# Patient Record
Sex: MALE | Race: WHITE | URBAN - METROPOLITAN AREA
[De-identification: names, ages, dates, MRNs, and addresses within clinical notes are randomized per-mention and may not be internally consistent; named-entity substitution may affect disease eponyms.]

---

## 2017-02-01 ENCOUNTER — TELEPHONE (OUTPATIENT)
Dept: FAMILY MEDICINE | Facility: CLINIC | Age: 62
End: 2017-02-01

## 2017-02-01 NOTE — TELEPHONE ENCOUNTER
"Pt reports having some tearing or discomfort with having BM lately, and this am had a large clot come out. Last Thursday evening, pt reports large BM about 3\" around and 18\" long and blood on toilet paper.  Pt reports having external hemorrhoids.        Duration: this am, blood from rectum with large clot. Reports having BM every 2-3 days.      Description (location/character/radiation): rectal area    Intensity:  moderate    Accompanying signs and symptoms: see above, denies pain, denies further bleeding    History (similar episodes/previous evaluation): yes    Precipitating or alleviating factors: None    Therapies tried and outcome: previous suppository for hemorrhoids, not used for this lately. Pt eats fiber and fruit.       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  Putting off appointments for a long time. Pt denies nausea, eating fine, working. [phone] cell or home is better (can leave a detailed vm with wife, Betty per pt request).      Pt advised to call the clinic with any further symptoms or changes. Also advised to go to UC or ER if symptoms increase. The patient indicates understanding of these issues and agrees with the plan. Advised pt to schedule and let wife know, pts cell was busy upon call back. Wife scheduled pt.     Ninfa Frazier RN          "

## 2017-02-02 ENCOUNTER — OFFICE VISIT (OUTPATIENT)
Dept: FAMILY MEDICINE | Facility: CLINIC | Age: 62
End: 2017-02-02
Payer: COMMERCIAL

## 2017-02-02 VITALS
SYSTOLIC BLOOD PRESSURE: 136 MMHG | WEIGHT: 240 LBS | TEMPERATURE: 97 F | BODY MASS INDEX: 37.67 KG/M2 | DIASTOLIC BLOOD PRESSURE: 76 MMHG | HEIGHT: 67 IN | RESPIRATION RATE: 12 BRPM | HEART RATE: 96 BPM | OXYGEN SATURATION: 99 %

## 2017-02-02 DIAGNOSIS — Z12.5 SCREENING FOR PROSTATE CANCER: ICD-10-CM

## 2017-02-02 DIAGNOSIS — F17.200 TOBACCO USE DISORDER: ICD-10-CM

## 2017-02-02 DIAGNOSIS — Z11.59 NEED FOR HEPATITIS C SCREENING TEST: ICD-10-CM

## 2017-02-02 DIAGNOSIS — F17.200 NEEDS SMOKING CESSATION EDUCATION: ICD-10-CM

## 2017-02-02 DIAGNOSIS — K59.00 CONSTIPATION, UNSPECIFIED CONSTIPATION TYPE: ICD-10-CM

## 2017-02-02 DIAGNOSIS — Z12.11 SCREEN FOR COLON CANCER: ICD-10-CM

## 2017-02-02 DIAGNOSIS — K62.5 RECTAL BLEEDING: Primary | ICD-10-CM

## 2017-02-02 LAB
ERYTHROCYTE [DISTWIDTH] IN BLOOD BY AUTOMATED COUNT: 12.5 % (ref 10–15)
HCT VFR BLD AUTO: 46.7 % (ref 40–53)
HGB BLD-MCNC: 16.5 G/DL (ref 13.3–17.7)
MCH RBC QN AUTO: 32.5 PG (ref 26.5–33)
MCHC RBC AUTO-ENTMCNC: 35.3 G/DL (ref 31.5–36.5)
MCV RBC AUTO: 92 FL (ref 78–100)
PLATELET # BLD AUTO: 203 10E9/L (ref 150–450)
RBC # BLD AUTO: 5.08 10E12/L (ref 4.4–5.9)
WBC # BLD AUTO: 9.4 10E9/L (ref 4–11)

## 2017-02-02 PROCEDURE — 99203 OFFICE O/P NEW LOW 30 MIN: CPT | Performed by: FAMILY MEDICINE

## 2017-02-02 PROCEDURE — 86803 HEPATITIS C AB TEST: CPT | Performed by: FAMILY MEDICINE

## 2017-02-02 PROCEDURE — G0103 PSA SCREENING: HCPCS | Performed by: FAMILY MEDICINE

## 2017-02-02 PROCEDURE — 36415 COLL VENOUS BLD VENIPUNCTURE: CPT | Performed by: FAMILY MEDICINE

## 2017-02-02 PROCEDURE — 85027 COMPLETE CBC AUTOMATED: CPT | Performed by: FAMILY MEDICINE

## 2017-02-02 RX ORDER — POLYETHYLENE GLYCOL 3350 17 G/17G
1 POWDER, FOR SOLUTION ORAL DAILY
Qty: 510 G | Refills: 1 | COMMUNITY
Start: 2017-02-02

## 2017-02-02 NOTE — NURSING NOTE
"Chief Complaint   Patient presents with     Rectal Problem       Initial /76 mmHg  Pulse 96  Temp(Src) 97  F (36.1  C) (Tympanic)  Resp 12  Ht 5' 7\" (1.702 m)  Wt 240 lb (108.863 kg)  BMI 37.58 kg/m2  SpO2 99% Estimated body mass index is 37.58 kg/(m^2) as calculated from the following:    Height as of this encounter: 5' 7\" (1.702 m).    Weight as of this encounter: 240 lb (108.863 kg).  BP completed using cuff size: large  "

## 2017-02-02 NOTE — PROGRESS NOTES
"  SUBJECTIVE:                                                    French Campbell is a 61 year old male who presents to clinic today for the following health issues:    Triaged 02/01/2017    Pt reports having some tearing or discomfort with having BM lately. Seven days ago, the pt reports large BM about 3\" in diameter and 18\" long and blood on toilet paper. Yesterday the patient felt like he was going to have a bowel movement and instead passed a blood clot the size of a baseball. The patient reports having external hemorrhoids. The patient states that he eats fiber bars for lunch everyday. He also drinks caffeine.         Duration: this am, blood from rectum with large clot. Reports having BM every 2-3 days.       Description (location/character/radiation): rectal area    Intensity:  moderate    Accompanying signs and symptoms: fatigue, denies pain, denies further bleeding    History (similar episodes/previous evaluation): yes    Precipitating or alleviating factors: None    Therapies tried and outcome: previous suppository for hemorrhoids, not used for this lately. Pt eats fiber and fruit.        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  Putting off appointments for a long time. Pt denies nausea and dietary changes. [phone] cell or home is better (can leave a detailed vm with wife, Betty per pt request).      Pt advised to call the clinic with any further symptoms or changes. Also advised to go to UC or ER if symptoms increase. The patient indicates understanding of these issues and agrees with the plan. Advised pt to schedule and let wife know, pts cell was busy upon call back. Wife scheduled appointment.              Problem list and histories reviewed & adjusted, as indicated.  Additional history: as documented      ROS:  Constitutional, HEENT, cardiovascular, pulmonary, GI, , musculoskeletal, neuro, skin, endocrine and psych systems are negative, except as otherwise noted.    This " "document serves as a record of the services and decisions personally performed and made by Ziyad Sidhu MD. It was created on his behalf by Shira Eckert, a trained medical scribe. The creation of this document is based on the provider's statements to the medical scribe.  Shira Eckert 3:22 PM 2/2/2017  OBJECTIVE:                                                    /76 mmHg  Pulse 96  Temp(Src) 97  F (36.1  C) (Tympanic)  Resp 12  Ht 1.702 m (5' 7\")  Wt 108.863 kg (240 lb)  BMI 37.58 kg/m2  SpO2 99% Body mass index is 37.58 kg/(m^2).   GENERAL: healthy, alert, well nourished, well hydrated, no distress  HENT: ear canals- normal; TMs- normal; Nose- normal; Mouth- no ulcers, no lesions  NECK: no tenderness, no adenopathy, no asymmetry, no masses, no stiffness; thyroid- normal to palpation  RESP: lungs clear to auscultation - no rales, no rhonchi, no wheezes  CV: regular rates and rhythm, normal S1 S2, no S3 or S4 and no murmur, no click or rub -  ABDOMEN: soft, no tenderness, no  hepatosplenomegaly, no masses, normal bowel sounds  MS: extremities- no gross deformities noted, no edema  SKIN: no suspicious lesions, no rashes  RECTAL- male: no masses, no hemorhoids, Prostate- symmetric, no  nodularity, no masses, no hypertrophy    Diagnostic test results:  Pending    Results for orders placed or performed in visit on 02/02/17 (from the past 48 hour(s))   CBC with platelets   Result Value Ref Range    WBC 9.4 4.0 - 11.0 10e9/L    RBC Count 5.08 4.4 - 5.9 10e12/L    Hemoglobin 16.5 13.3 - 17.7 g/dL    Hematocrit 46.7 40.0 - 53.0 %    MCV 92 78 - 100 fl    MCH 32.5 26.5 - 33.0 pg    MCHC 35.3 31.5 - 36.5 g/dL    RDW 12.5 10.0 - 15.0 %    Platelet Count 203 150 - 450 10e9/L          ASSESSMENT/PLAN:       French was seen today for rectal problem.    Diagnoses and all orders for this visit:    Rectal bleeding - one episode and no bleeding now - Use Miralax daily for the next three months, schedule " "colonoscopy  -     CBC with platelets  -     GASTROENTEROLOGY ADULT REF PROCEDURE ONLY  -     polyethylene glycol (MIRALAX) powder; Take 17 g (1 capful) by mouth daily    Constipation, unspecified constipation type - Use Miralax daily for the next three months    Tobacco use disorder - tobacco cessation discussed, [email or web address]    Need for hepatitis C screening test  -     Hepatitis C Screen Reflex to HCV RNA Quant and Genotype    Needs smoking cessation education - tobacco cessation discussed, USEREADY    Screening for prostate cancer  -     PROSTATE SPEC ANTIGEN SCREEN    Screen for colon cancer - schedule colonoscopy      Risks, benefits and alternatives of treatments discussed. Plan agreed on.      Followup: As needed    Will call, return to clinic, or go to ED if worsening or symptoms not improving as discussed.    See patient instructions.     BP Screening:   Last 3 BP Readings:    BP Readings from Last 3 Encounters:   02/02/17 136/76   11/28/07 118/74   11/09/07 112/74       The following was recommended to the patient:  Re-screen BP within a year and recommended lifestyle modifications    Tobacco Cessation:   reports that he has been smoking Cigars.  He does not have any smokeless tobacco history on file.  Tobacco Cessation Action Plan: Information offered: Patient not interested at this time    BMI:   Estimated body mass index is 37.58 kg/(m^2) as calculated from the following:    Height as of this encounter: 1.702 m (5' 7\").    Weight as of this encounter: 108.863 kg (240 lb).   Weight management plan: Discussed healthy diet and exercise guidelines and patient will follow up in 12 months in clinic to re-evaluate.      Health Maintenance Topics with due status: Overdue       Topic Date Due    ADVANCE DIRECTIVE PLANNING Q5 YRS (NO INBASKET) 08/06/1973    HEPATITIS C SCREENING 08/06/1973    COLON CANCER SCREEN (SYSTEM ASSIGNED) 08/06/2005    PSA Q1 YR 11/28/2008    LIPID MONITORING Q5 YEARS (NO INBASKET) " 11/28/2012    INFLUENZA VACCINE (SYSTEM ASSIGNED) 09/01/2016       Health maintenance reviewed/updated? Yes    The information in this document, created by a scribe for me, accurately reflects the services I personally performed and the decisions made by me. I have reviewed and approved this document for accuracy.      Vern Sidhu MD

## 2017-02-02 NOTE — Clinical Note
[address] [address]                  [phone]   February 6, 2017    French Campbell  [address] [facility] [id number]      Dear French,    Here is a summary of your recent test results:    -PSA (prostate specific antigen) test is normal.  This indicates a low likelihood of prostate cancer.  ADVISE: yearly recheck.   -Normal red blood cell (hgb) levels, normal white blood cell count and normal platelet levels.  -Hepatitis C antibody screen test shows no signs of a previous hepatitis C infection.     For additional lab test information, labtestsonline.org is an excellent reference.    Your test results are enclosed.      Please contact me if you have any questions.    In addition, here is a list of due or overdue Health Maintenance reminders.    Health Maintenance Due   Topic Date Due     Discuss Advance Directive Planning  08/06/1973     Colon Cancer Screening - every 10 years.  08/06/2005     Cholesterol Lab - every 5 years  11/28/2012     Flu Vaccine - yearly  09/01/2016       Please call us at [phone] (or use Pryv) to address the above recommendations.            Thank you very much for trusting [facility]..     Healthy regards,          Vern Sidhu M.D.      Results for orders placed or performed in visit on 02/02/17   PROSTATE SPEC ANTIGEN SCREEN   Result Value Ref Range    PSA 0.70 0 - 4 ug/L   Hepatitis C Screen Reflex to HCV RNA Quant and Genotype   Result Value Ref Range    Hepatitis C Antibody  NR     Nonreactive   Assay performance characteristics have not been established for newborns,   infants, and children     CBC with platelets   Result Value Ref Range    WBC 9.4 4.0 - 11.0 10e9/L    RBC Count 5.08 4.4 - 5.9 10e12/L    Hemoglobin 16.5 13.3 - 17.7 g/dL    Hematocrit 46.7 40.0 - 53.0 %    MCV 92 78 - 100 fl    MCH 32.5 26.5 - 33.0 pg    MCHC 35.3 31.5 - 36.5 g/dL    RDW 12.5 10.0 - 15.0 %     Platelet Count 203 150 - 450 10e9/L

## 2017-02-02 NOTE — MR AVS SNAPSHOT
After Visit Summary   2/2/2017    French Campbell    MRN: [id number]           Patient Information     Date Of Birth          1955        Visit Information        Provider Department      2/2/2017 2:40 PM Ziyad Sidhu MD [facility] [place]        Today's Diagnoses     Rectal bleeding    -  1     Constipation, unspecified constipation type         Tobacco use disorder         Need for hepatitis C screening test         Needs smoking cessation education         Screening for prostate cancer         Screen for colon cancer           Care Instructions                   Constipation  What is constipation?   Constipation means that bowel movements are infrequent and hard to pass and cause you to strain during bowel movements. It is not considered to be constipation if the bowel movement is not hard and difficult to pass.  What is the normal frequency of bowel movements for one person can be different for another person. For some people, 3 times a day is normal. For others once every 3 days may be normal. What's important is whether there is a change in what has been normal for you.   How does it occur?   You may have constipation because:  You ignore the urge and wait too long to have bowel movements.   You overuse some types of laxatives.   You do not drink enough fluids.   You do not eat enough fiber.   You don't have enough physical activity.   You are taking iron pills or a medicine that has a side effect of constipation.  Other possible causes are:  pregnancy   depression or stress   some medical conditions and diseases.  What are the symptoms?   Symptoms may include having:  small bowel movements   hard, dry bowel movements   uncomfortable or painful bowel movements that are hard to pass   a longer time than usual between bowel movements   bloating and feeling like you have a full bowel.  Normal bowel movements vary from person to person. For some people, 3 times a day is normal. For  others 3 times a week may be normal. What's important are changes in what has been normal for you.  How is it treated?   To ease your constipation:  Drink more fluids.   Add more fiber to your diet, such as bran muffins, danika crackers, oatmeal, brown rice, whole wheat bread, fresh fruits and vegetables, and popcorn.   Get more exercise.   Make sure that you go to the bathroom whenever you feel that you need to go. Don t wait.  Laxatives may be used for a short time, generally less than 1 week. Many people find fiber supplements, such as Metamucil, Citrucel, or other psyllium products, to be helpful, but sometimes they can make constipation worse.  Ask your healthcare provider if any medicines you are taking may be causing constipation.  Tell your healthcare provider if:  You start having constipation after years of normal bowel movements.   You have bouts of constipation alternating with bouts of diarrhea.   You have pain during bowel movements or for some time afterward.   Your bowel movements are dark or tar-colored or have blood in them.   You are losing weight without trying.  How can I take care of myself?   To help take care of yourself:  Eat fresh vegetables and fruit every day.   Exercise regularly. For example, if you are able, walk for at least 30 minutes every day. Check with your healthcare provider before adding any new exercise.   Drink prune juice or eat stewed fruits at breakfast.   Drink enough liquids each day to keep your urine light yellow in color.   Increase the whole-grain fiber in your diet by eating cereals with 5 or more grams of fiber per serving (for example, shredded wheat or bran flakes).   Ask your healthcare provider about taking fiber products or laxatives or giving yourself an enema. You can take a fiber product like Metamucil or Citrucel once or twice a day for several days if you are constipated. Avoid overusing other laxatives, such as cathartics, which are products that will  cause a liquid bowel movement. Cathartics, including Milk of magnesia or Epsom salt, irritate the lining of the intestines.   Call your provider if:   Constipation lasts longer than 1 week.   You have constipation alternating with diarrhea.   You have blood in your stool.   You have severe abdominal pain.   You have abdominal swelling or vomiting.   You have a fever higher than 101.5  F (38.6  C).   You have any symptoms that worry you.     Published by [employer].  This content is reviewed periodically and is subject to change as new health information becomes available. The information is intended to inform and educate and is not a replacement for medical evaluation, advice, diagnosis or treatment by a healthcare professional.  Developed by Vandana Fonseca RN, [place], and [employer].    2011 [employer] and/or its affiliates. All rights reserved.           Follow-ups after your visit        Additional Services     GASTROENTEROLOGY ADULT REF PROCEDURE ONLY       Last Lab Result: CREATININE (mg/dL)       Date                     Value                 11/28/2007               1.03             ----------  Body mass index is 37.58 kg/(m^2).      Patient will be contacted to schedule procedure.     Please be aware that coverage of these services is subject to the terms and limitations of your health insurance plan.  Call member services at your health plan with any benefit or coverage questions.  Any procedures must be performed at a [facility] facility OR coordinated by your clinic's referral office.    Please bring the following with you to your appointment:    (1) Any X-Rays, CTs or MRIs which have been performed.  Contact the facility where they were done to arrange for  prior to your scheduled appointment.    (2) List of current medications   (3) This referral request   (4) Any documents/labs given to you for this referral                  Who to contact     If you have questions or need follow up information  "about today's clinic visit or your schedule please contact [facility] directly at [phone].  Normal or non-critical lab and imaging results will be communicated to you by MyChart, letter or phone within 4 business days after the clinic has received the results. If you do not hear from us within 7 days, please contact the clinic through [employer] or phone. If you have a critical or abnormal lab result, we will notify you by phone as soon as possible.  Submit refill requests through [employer] or call your pharmacy and they will forward the refill request to us. Please allow 3 business days for your refill to be completed.          Additional Information About Your Visit        [employer] Information     [employer] lets you send messages to your doctor, view your test results, renew your prescriptions, schedule appointments and more. To sign up, go to [email or web address] . Click on \"Log in\" on the left side of the screen, which will take you to the Welcome page. Then click on \"Sign up Now\" on the right side of the page.     You will be asked to enter the access code listed below, as well as some personal information. Please follow the directions to create your username and password.     Your access code is: [email or web address]  Expires: 5/3/2017  3:46 PM     Your access code will  in 90 days. If you need help or a new code, please call your [place] clinic or [phone].        Care EveryWhere ID     This is your Care EveryWhere ID. This could be used by other organizations to access your [facility] medical records  [id number]        Your Vitals Were     Pulse Temperature Respirations Height BMI (Body Mass Index) Pulse Oximetry    96 97  F (36.1  C) (Tympanic) 12 5' 7\" (1.702 m) 37.58 kg/m2 99%       Blood Pressure from Last 3 Encounters:   17 136/76   07 118/74   07 112/74    Weight from Last 3 Encounters:   17 240 lb (108.863 kg)   07 243 lb (110.224 kg)   07 243 lb " (110.224 kg)              We Performed the Following     CBC with platelets     GASTROENTEROLOGY ADULT REF PROCEDURE ONLY     Hepatitis C Screen Reflex to HCV RNA Quant and Genotype     PROSTATE SPEC ANTIGEN SCREEN          Today's Medication Changes          These changes are accurate as of: 2/2/17  3:46 PM.  If you have any questions, ask your nurse or doctor.               Start taking these medicines.        Dose/Directions    polyethylene glycol powder   Commonly known as:  MIRALAX   Used for:  Rectal bleeding   Started by:  Ziyad Sidhu MD        Dose:  1 capful   Take 17 g (1 capful) by mouth daily   Quantity:  510 g   Refills:  1            Where to get your medicines      Some of these will need a paper prescription and others can be bought over the counter.  Ask your nurse if you have questions.     You don't need a prescription for these medications    - polyethylene glycol powder             Primary Care Provider Office Phone # Fax #    Krishna Kan [phone]       [facility] [address] [place] [address]        Thank you!     Thank you for choosing [facility]  for your care. Our goal is always to provide you with excellent care. Hearing back from our patients is one way we can continue to improve our services. Please take a few minutes to complete the written survey that you may receive in the mail after your visit with us. Thank you!             Your Updated Medication List - Protect others around you: Learn how to safely use, store and throw away your medicines at www.disposemymeds.org.          This list is accurate as of: 2/2/17  3:46 PM.  Always use your most recent med list.                   Brand Name Dispense Instructions for use    aspirin 81 MG tablet     100    ONE DAILY       * CHANTIX STARTING MONTH AUSTIN 0.5 MG X 11 & 1 MG X 42 tablet   Generic drug:  varenicline     qs 1 mo    use as directed - stop smoking after 1 week -  use 1-800-QUIT-Now for support f/u after 3-4 weeks       * CHANTIX CONTINUING MONTH AUSTIN 1 MG tablet   Generic drug:  varenicline     60    1 TABLET TWICE DAILY       CIALIS 20 MG tablet   Generic drug:  tadalafil     12    1/2 to 1  TABLET, TAKEN AT LEAST 30 MINUTES BEFORE INTERCOURSE       NO ACTIVE MEDICATIONS     0    .       polyethylene glycol powder    MIRALAX    510 g    Take 17 g (1 capful) by mouth daily       * Notice:  This list has 2 medication(s) that are the same as other medications prescribed for you. Read the directions carefully, and ask your doctor or other care provider to review them with you.

## 2017-02-02 NOTE — PATIENT INSTRUCTIONS
Constipation  What is constipation?   Constipation means that bowel movements are infrequent and hard to pass and cause you to strain during bowel movements. It is not considered to be constipation if the bowel movement is not hard and difficult to pass.  What is the normal frequency of bowel movements for one person can be different for another person. For some people, 3 times a day is normal. For others once every 3 days may be normal. What's important is whether there is a change in what has been normal for you.   How does it occur?   You may have constipation because:  You ignore the urge and wait too long to have bowel movements.   You overuse some types of laxatives.   You do not drink enough fluids.   You do not eat enough fiber.   You don't have enough physical activity.   You are taking iron pills or a medicine that has a side effect of constipation.  Other possible causes are:  pregnancy   depression or stress   some medical conditions and diseases.  What are the symptoms?   Symptoms may include having:  small bowel movements   hard, dry bowel movements   uncomfortable or painful bowel movements that are hard to pass   a longer time than usual between bowel movements   bloating and feeling like you have a full bowel.  Normal bowel movements vary from person to person. For some people, 3 times a day is normal. For others 3 times a week may be normal. What's important are changes in what has been normal for you.  How is it treated?   To ease your constipation:  Drink more fluids.   Add more fiber to your diet, such as bran muffins, danika crackers, oatmeal, brown rice, whole wheat bread, fresh fruits and vegetables, and popcorn.   Get more exercise.   Make sure that you go to the bathroom whenever you feel that you need to go. Don t wait.  Laxatives may be used for a short time, generally less than 1 week. Many people find fiber supplements, such as Metamucil, Citrucel, or other psyllium  products, to be helpful, but sometimes they can make constipation worse.  Ask your healthcare provider if any medicines you are taking may be causing constipation.  Tell your healthcare provider if:  You start having constipation after years of normal bowel movements.   You have bouts of constipation alternating with bouts of diarrhea.   You have pain during bowel movements or for some time afterward.   Your bowel movements are dark or tar-colored or have blood in them.   You are losing weight without trying.  How can I take care of myself?   To help take care of yourself:  Eat fresh vegetables and fruit every day.   Exercise regularly. For example, if you are able, walk for at least 30 minutes every day. Check with your healthcare provider before adding any new exercise.   Drink prune juice or eat stewed fruits at breakfast.   Drink enough liquids each day to keep your urine light yellow in color.   Increase the whole-grain fiber in your diet by eating cereals with 5 or more grams of fiber per serving (for example, shredded wheat or bran flakes).   Ask your healthcare provider about taking fiber products or laxatives or giving yourself an enema. You can take a fiber product like Metamucil or Citrucel once or twice a day for several days if you are constipated. Avoid overusing other laxatives, such as cathartics, which are products that will cause a liquid bowel movement. Cathartics, including Milk of magnesia or Epsom salt, irritate the lining of the intestines.   Call your provider if:   Constipation lasts longer than 1 week.   You have constipation alternating with diarrhea.   You have blood in your stool.   You have severe abdominal pain.   You have abdominal swelling or vomiting.   You have a fever higher than 101.5  F (38.6  C).   You have any symptoms that worry you.     Published by [employer].  This content is reviewed periodically and is subject to change as new health information becomes available. The  information is intended to inform and educate and is not a replacement for medical evaluation, advice, diagnosis or treatment by a healthcare professional.  Developed by Vandana Fonseca RN, [place], and [employer].    2011 [employer] and/or its affiliates. All rights reserved.

## 2017-02-03 LAB
HCV AB SERPL QL IA: NORMAL
PSA SERPL-ACNC: 0.7 UG/L (ref 0–4)